# Patient Record
Sex: MALE | Race: WHITE | NOT HISPANIC OR LATINO | Employment: UNEMPLOYED | URBAN - METROPOLITAN AREA
[De-identification: names, ages, dates, MRNs, and addresses within clinical notes are randomized per-mention and may not be internally consistent; named-entity substitution may affect disease eponyms.]

---

## 2017-12-08 ENCOUNTER — HOSPITAL ENCOUNTER (EMERGENCY)
Facility: OTHER | Age: 42
Discharge: LEFT BEFORE DISCHARGE | End: 2017-12-08
Attending: EMERGENCY MEDICINE
Payer: MEDICAID

## 2017-12-08 VITALS
SYSTOLIC BLOOD PRESSURE: 157 MMHG | WEIGHT: 190 LBS | BODY MASS INDEX: 28.14 KG/M2 | HEART RATE: 84 BPM | OXYGEN SATURATION: 100 % | TEMPERATURE: 98 F | HEIGHT: 69 IN | DIASTOLIC BLOOD PRESSURE: 89 MMHG | RESPIRATION RATE: 16 BRPM

## 2017-12-08 DIAGNOSIS — M54.16 LUMBAR RADICULOPATHY, ACUTE: ICD-10-CM

## 2017-12-08 DIAGNOSIS — R20.2 PARESTHESIAS: Primary | ICD-10-CM

## 2017-12-08 DIAGNOSIS — M54.32 SCIATICA OF LEFT SIDE: ICD-10-CM

## 2017-12-08 DIAGNOSIS — S39.012A LUMBOSACRAL STRAIN, INITIAL ENCOUNTER: ICD-10-CM

## 2017-12-08 DIAGNOSIS — M54.42 ACUTE LEFT-SIDED LOW BACK PAIN WITH LEFT-SIDED SCIATICA: ICD-10-CM

## 2017-12-08 PROCEDURE — 99282 EMERGENCY DEPT VISIT SF MDM: CPT

## 2017-12-08 RX ORDER — METHYLPREDNISOLONE SOD SUCC 125 MG
125 VIAL (EA) INJECTION
Status: DISCONTINUED | OUTPATIENT
Start: 2017-12-08 | End: 2017-12-08 | Stop reason: HOSPADM

## 2017-12-08 RX ORDER — KETOROLAC TROMETHAMINE 30 MG/ML
15 INJECTION, SOLUTION INTRAMUSCULAR; INTRAVENOUS
Status: DISCONTINUED | OUTPATIENT
Start: 2017-12-08 | End: 2017-12-08 | Stop reason: HOSPADM

## 2017-12-08 NOTE — ED PROVIDER NOTES
"Encounter Date: 12/8/2017    SCRIBE #1 NOTE: I, Joesph Jalil, am scribing for, and in the presence of, Dr. Milan.       History     Chief Complaint   Patient presents with    Back Pain     " I stood to get out of bed this morning and my left leg just went out on my. My foot still feels a little numb coming and going". Denies hitting head or LOC at time of fall. Denies facial droop, HA, slurred speech or extremity drift noted.      11:45 AM    Patient is a 42 y.o. male with a history of thyroid cancer who presents to the ED with complaint of back pain. He reports sudden onset of pain this morning. He states "I got out of bed this morning felt a pain like a thousand needles and fell to the ground... It felt like two bones hit each other." He reports pain radiates down the left leg and the left foot felt "numb and tingly." He describes back pain currently as "like a knot" and notes numbness in the left foot comes and goes. He denies any bowel incontinence, urinary incontinence, urinary retention, saddle paraesthesia, fever, chills, or night sweats. He denies head trauma or LOC at time of fall this morning. He states he has never had similar pain in the past and denies a history of back problems. He denies recent trauma, heavy lifting, abscesses or lesions. He reports having a thyroidectomy for thyroid cancer, and reports having a full body PET scan to search for cancer in May which was clean. He also states he had a recent physical exam which was normal, and notes he had a colonoscopy three years ago. He reports no other major medical problems, daily medications, or known allergies. He reports a family history of colon and stomach cancer (father). He admits to use of tobacco, but denies use of alcohol or illicit drugs. He has no additional complaints.      The history is provided by the patient.     Review of patient's allergies indicates:   Allergen Reactions    Penicillins Hives     Past Medical History: "   Diagnosis Date    Cancer     thryoid cancer     Past Surgical History:   Procedure Laterality Date    THYROID SURGERY       History reviewed. No pertinent family history.  Social History   Substance Use Topics    Smoking status: Current Some Day Smoker    Smokeless tobacco: Never Used    Alcohol use No      Comment: former drinker     Review of Systems   Constitutional: Negative for appetite change, chills, fatigue and fever.   HENT: Negative for congestion and sore throat.    Eyes: Negative for photophobia and redness.   Respiratory: Negative for cough and shortness of breath.    Cardiovascular: Negative for chest pain.   Gastrointestinal: Negative for abdominal pain, nausea and vomiting.        Negative for bowel incontinence.   Genitourinary: Negative for dysuria.        Negative for urinary incontinence or retention.   Musculoskeletal: Positive for back pain. Negative for neck pain.   Skin: Negative for rash.   Neurological: Positive for numbness (left foot numbness and tingling). Negative for weakness, light-headedness and headaches.        Negative for saddle paraesthesia.   Psychiatric/Behavioral: Negative for confusion.       Physical Exam     Initial Vitals [12/08/17 1039]   BP Pulse Resp Temp SpO2   (!) 157/89 84 16 98.2 °F (36.8 °C) 100 %      MAP       111.67         Physical Exam    Nursing note and vitals reviewed.  Constitutional: He appears well-developed and well-nourished. He is not diaphoretic. No distress.   HENT:   Head: Normocephalic and atraumatic.   Mouth/Throat: Oropharynx is clear and moist.   Eyes: Conjunctivae and EOM are normal. Pupils are equal, round, and reactive to light.   Neck: Normal range of motion. Neck supple.   Cardiovascular: Normal rate, regular rhythm, S1 normal, S2 normal and normal heart sounds. Exam reveals no gallop and no friction rub.    No murmur heard.  Pulmonary/Chest: Breath sounds normal. No respiratory distress. He has no wheezes. He has no rhonchi. He  has no rales.   Abdominal: Soft. Bowel sounds are normal. There is no tenderness. There is no rebound and no guarding.   Musculoskeletal: Normal range of motion. He exhibits no edema or tenderness.   No midline C-T-L-spine tenderness to palpation, crepitus or step-offs.    Lymphadenopathy:     He has no cervical adenopathy.   Neurological: He is alert and oriented to person, place, and time. He has normal strength. No sensory deficit.   Bilateral lower extremities: strength 5/5, sensation intact to light touch.   Skin: Skin is warm and dry. Capillary refill takes less than 2 seconds. No rash noted. No pallor.   Psychiatric: He has a normal mood and affect. His behavior is normal. Judgment and thought content normal.         ED Course   Procedures  Labs Reviewed - No data to display  Imaging Results    None                 Medical Decision Making:   Clinical Tests:   Lab Tests: Ordered and Reviewed  Radiological Study: Ordered and Reviewed            Scribe Attestation:   Scribe #1: I performed the above scribed service and the documentation accurately describes the services I performed. I attest to the accuracy of the note.    Attending Attestation:           Physician Attestation for Scribe:  Physician Attestation Statement for Scribe #1: I, Dr. Milan, reviewed documentation, as scribed by Joesph Jimenes in my presence, and it is both accurate and complete.         Attending ED Notes:   Emergent evaluation a 42-year-old male complaining of an acute onset of low back pain with associated pain radiating down left leg, paresthesias and left lower extremity weakness.  Patient is afebrile, nontoxic-appearing with stable vital signs.  No midline C-spine, T-spine or L-spine tenderness to palpation, crepitus or step-offs.  Patient denies any urinary retention.  No bowel or bladder incontinence.  No saddle paresthesias.Strength 5/5 throughout.  Sensation intact to light touch throughout.  Two point discrimination intact  throughout.  No saddle paresthesias.  Reflexes 2+ throughout.  Differential considerations include but are not limited to spinal abscess, spinal mass mass or slipped intravertebral disc.  Full workup and evaluation including MRI of the spine with and without contrast ordered.  Patient amenable to full workup and evaluation.  Patient eloped from the emergency department without M.D. knowledge.          ED Course      Clinical Impression:     1. Paresthesias    2. Sciatica of left side    3. Acute left-sided low back pain with left-sided sciatica    4. Lumbosacral strain, initial encounter    5. Lumbar radiculopathy, acute                                 Wero Urrutia MD  12/10/17 [id number]

## 2017-12-08 NOTE — ED TRIAGE NOTES
"Pt reports to ED c/o "sharp, grinding" left lower back pain that radiates down back of left leg with intermittent numbness/tingling to left leg worsening with ambulation. Pain started this morning after waking up, unable to stand initially on leg due to pain and numbness. Pt is ambulatory, full weight-bearing status with slow and steady gait. Pt denies chest pain, SOB, recent fall or trauma. 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   "

## 2017-12-08 NOTE — ED NOTES
Pt able to ambulate w/ no assistance needed, no active distress witnessed w/ full ROM, steady gait noted.

## 2024-08-14 DIAGNOSIS — M47.27 LUMBOSACRAL RADICULOPATHY DUE TO DEGENERATIVE JOINT DISEASE OF SPINE: ICD-10-CM

## 2024-08-14 DIAGNOSIS — M47.896 OTHER OSTEOARTHRITIS OF SPINE, LUMBAR REGION: Primary | ICD-10-CM

## 2024-08-14 DIAGNOSIS — G89.4 CHRONIC PAIN SYNDROME: ICD-10-CM
